# Patient Record
Sex: MALE
[De-identification: names, ages, dates, MRNs, and addresses within clinical notes are randomized per-mention and may not be internally consistent; named-entity substitution may affect disease eponyms.]

---

## 2023-07-07 ENCOUNTER — RX ONLY (RX ONLY)
Age: 73
End: 2023-07-07

## 2023-07-07 ENCOUNTER — OFFICE (OUTPATIENT)
Facility: LOCATION | Age: 73
Setting detail: OPHTHALMOLOGY
End: 2023-07-07
Payer: MEDICARE

## 2023-07-07 DIAGNOSIS — B02.31: ICD-10-CM

## 2023-07-07 DIAGNOSIS — H25.13: ICD-10-CM

## 2023-07-07 PROBLEM — H43.393 VITREOUS FLOATERS; BOTH EYES: Status: ACTIVE | Noted: 2023-07-07

## 2023-07-07 PROBLEM — H16.223 DRY EYE SYNDROME K SICCA; BOTH EYES: Status: ACTIVE | Noted: 2023-07-07

## 2023-07-07 PROBLEM — H35.40 PERIPAPILLARY ATROPHY ; BOTH EYES: Status: ACTIVE | Noted: 2023-07-07

## 2023-07-07 PROBLEM — H01.00 BLEPHARITIS ; BOTH EYES: Status: ACTIVE | Noted: 2023-07-07

## 2023-07-07 PROCEDURE — 99213 OFFICE O/P EST LOW 20 MIN: CPT | Performed by: OPHTHALMOLOGY

## 2023-07-07 ASSESSMENT — LID EXAM ASSESSMENTS
OD_COMMENTS: GPC
OS_BLEPHARITIS: T
OD_BLEPHARITIS: T
OS_COMMENTS: GPC

## 2023-07-07 ASSESSMENT — TONOMETRY
OD_IOP_MMHG: 16
OS_IOP_MMHG: 16

## 2023-07-07 ASSESSMENT — SUPERFICIAL PUNCTATE KERATITIS (SPK)
OS_SPK: T
OD_SPK: T

## 2023-07-07 ASSESSMENT — CONFRONTATIONAL VISUAL FIELD TEST (CVF)
OS_FINDINGS: FULL
OD_FINDINGS: FULL

## 2023-07-12 ASSESSMENT — VISUAL ACUITY
OS_BCVA: 20/25
OD_BCVA: 20/25+2

## 2024-06-19 PROBLEM — Z00.00 ENCOUNTER FOR PREVENTIVE HEALTH EXAMINATION: Status: ACTIVE | Noted: 2024-06-19

## 2024-06-21 ENCOUNTER — APPOINTMENT (OUTPATIENT)
Dept: ORTHOPEDIC SURGERY | Facility: CLINIC | Age: 74
End: 2024-06-21
Payer: MEDICARE

## 2024-06-21 VITALS — BODY MASS INDEX: 24.44 KG/M2 | HEIGHT: 69 IN | WEIGHT: 165 LBS

## 2024-06-21 DIAGNOSIS — M23.91 UNSPECIFIED INTERNAL DERANGEMENT OF RIGHT KNEE: ICD-10-CM

## 2024-06-21 DIAGNOSIS — Z78.9 OTHER SPECIFIED HEALTH STATUS: ICD-10-CM

## 2024-06-21 DIAGNOSIS — I10 ESSENTIAL (PRIMARY) HYPERTENSION: ICD-10-CM

## 2024-06-21 PROCEDURE — 99203 OFFICE O/P NEW LOW 30 MIN: CPT

## 2024-06-21 PROCEDURE — 73562 X-RAY EXAM OF KNEE 3: CPT | Mod: RT

## 2024-06-21 RX ORDER — AMLODIPINE AND ATORVASTATIN 5; 20 MG/1; MG/1
5-20 TABLET, COATED ORAL
Refills: 0 | Status: ACTIVE | COMMUNITY

## 2024-06-21 RX ORDER — OMEPRAZOLE 20 MG/1
20 TABLET, DELAYED RELEASE ORAL
Refills: 0 | Status: ACTIVE | COMMUNITY

## 2024-06-21 NOTE — HISTORY OF PRESENT ILLNESS
[de-identified] : Patient presents today for right knee pain. Patient states he was picking up twigs and branches when he stepped wrong 10 days ago.  Patient states he has pain along the medial aspect of the knee. Patient states he feels increased pain and tightness with full extension.  Patient states he has been using ice. Patient admits to taking Ibuprofen for pain with some relief.  [7] : 7 [2] : 2 [Dull/Aching] : dull/aching [Intermittent] : intermittent [Household chores] : household chores [Leisure] : leisure [Social interactions] : social interactions [Rest] : rest [Retired] : Work status: retired [] : no [FreeTextEntry1] : right knee [de-identified] : movement

## 2024-06-21 NOTE — PHYSICAL EXAM
[de-identified] : Examination of the right knee is as follows: INSPECTION: mild effusion, but no ecchymosis, no erythema, no atrophy, no deformities of quad tendon or of patellar tendon PALPATION: medial joint line tenderness, but no palpable mass, no obvious defects, no increased warmth, no calf tenderness. TTP over fibular head ROM: flexion 125 degrees, but extension 0 degrees STRENGTH: quadriceps 5/5, hamstring 5/5 TESTING: + McMurrays NEURO: light touch is intact throughout GAIT: antalgic, but patient ambulates without assistive device  X-rays of the right knee is as follows:  Knee 3 view in the anteroposterior, lateral, skyline views: moderate tricompartmental OA with medial joint space narrowing

## 2024-06-21 NOTE — ASSESSMENT
[FreeTextEntry1] : 72 yo patient presents today with suspected RT Knee medial meniscus tear. Patient reports some increased discomfort with any pivoting movements, and some weakness in the knee.   -Activities as tolerated -Rest, ice, compression, elevation, NSAIDs PRN for pain.  -All questions answered -F/u 6 weeks  The diagnosis was explained in detail. The potential non-surgical and surgical treatments were reviewed. The relative risks and benefits of each option were considered relative to the patients age, activity level, medical history, symptom severity and previously attempted treatments.  The patient was advised to consult with their primary medical provider prior to initiation of any new medications to reduce the risk of adverse effects specific to their long-term home medications and medical history. The risk of gastrointestinal irritation and kidney injury specific to long-term NSAID use was discussed.   Entered by Clay Shannon acting as scribe. Dr. Ortiz Attestation The documentation recorded by the scribe, in my presence, accurately reflects the service I, Dr. Ortiz, personally performed, and the decisions made by me with my edits as appropriate.

## 2024-08-02 ENCOUNTER — APPOINTMENT (OUTPATIENT)
Dept: ORTHOPEDIC SURGERY | Facility: CLINIC | Age: 74
End: 2024-08-02
Payer: MEDICARE

## 2024-08-02 DIAGNOSIS — M23.91 UNSPECIFIED INTERNAL DERANGEMENT OF RIGHT KNEE: ICD-10-CM

## 2024-08-02 PROCEDURE — 99213 OFFICE O/P EST LOW 20 MIN: CPT

## 2024-08-02 NOTE — ASSESSMENT
[FreeTextEntry1] : 72 yo patient presenting today for f/u of suspected RT Knee medial meniscus tear. Patient stating that his pain overall is improved, notes pain with twisting motion of the knee.  -Discussed with patient that in the future if the pain worsens would recommend MRI to plan for arthroscopic surgery, patient expressed understanding -Activities as tolerated -Rest, ice, compression, elevation, NSAIDs PRN for pain.  -All questions answered -F/u PRN  The diagnosis was explained in detail. The potential non-surgical and surgical treatments were reviewed. The relative risks and benefits of each option were considered relative to the patients age, activity level, medical history, symptom severity and previously attempted treatments.  The patient was advised to consult with their primary medical provider prior to initiation of any new medications to reduce the risk of adverse effects specific to their long-term home medications and medical history. The risk of gastrointestinal irritation and kidney injury specific to long-term NSAID use was discussed.   Entered by John Tavarez PA-C acting as scribe. Dr. rOtiz Attestation The documentation recorded by the scribe, in my presence, accurately reflects the service I, Dr. Ortiz, personally performed, and the decisions made by me with my edits as appropriate.

## 2024-08-02 NOTE — PHYSICAL EXAM
[de-identified] : Examination of the right knee is as follows: INSPECTION: no effusion, no ecchymosis, no erythema, no atrophy, no deformities of quad tendon or of patellar tendon PALPATION: no medial joint line tenderness, no palpable mass, no obvious defects, no increased warmth, no calf tenderness. TTP over fibular head ROM: flexion 125 degrees, but extension 0 degrees STRENGTH: quadriceps 5/5, hamstring 5/5 TESTING: - McMurrays NEURO: light touch is intact throughout GAIT: non-antalgic, but patient ambulates without assistive device  X-rays of the right knee is as follows:  Knee 3 view in the anteroposterior, lateral, skyline views: mild tricompartmental OA with medial joint space narrowing

## 2024-08-02 NOTE — HISTORY OF PRESENT ILLNESS
[Sudden] : sudden [2] : 2 [0] : 0 [Dull/Aching] : dull/aching [Intermittent] : intermittent [Rest] : rest [Retired] : Work status: retired [de-identified] : Patient presents today for right knee pain. Patient being treated for medial meniscus tear. Patient states he has minimal aching pain with ROM.  [] : Post Surgical Visit: no [FreeTextEntry1] : Right knee [FreeTextEntry3] : 6/2024 [FreeTextEntry5] : Patient states he was picking up twigs and branches when he stepped wrong  [de-identified] : ROM

## 2025-06-05 ENCOUNTER — APPOINTMENT (OUTPATIENT)
Dept: ORTHOPEDIC SURGERY | Facility: CLINIC | Age: 75
End: 2025-06-05
Payer: MEDICARE

## 2025-06-05 VITALS — HEIGHT: 69 IN | WEIGHT: 165 LBS | BODY MASS INDEX: 24.44 KG/M2

## 2025-06-05 DIAGNOSIS — M51.369: ICD-10-CM

## 2025-06-05 DIAGNOSIS — M54.16 RADICULOPATHY, LUMBAR REGION: ICD-10-CM

## 2025-06-05 DIAGNOSIS — M48.061 SPINAL STENOSIS, LUMBAR REGION WITHOUT NEUROGENIC CLAUDICATION: ICD-10-CM

## 2025-06-05 DIAGNOSIS — M54.12 RADICULOPATHY, CERVICAL REGION: ICD-10-CM

## 2025-06-05 DIAGNOSIS — M48.02 SPINAL STENOSIS, CERVICAL REGION: ICD-10-CM

## 2025-06-05 DIAGNOSIS — M51.34 OTHER INTERVERTEBRAL DISC DEGENERATION, THORACIC REGION: ICD-10-CM

## 2025-06-05 PROCEDURE — 72100 X-RAY EXAM L-S SPINE 2/3 VWS: CPT

## 2025-06-05 PROCEDURE — 72040 X-RAY EXAM NECK SPINE 2-3 VW: CPT

## 2025-06-05 PROCEDURE — 72070 X-RAY EXAM THORAC SPINE 2VWS: CPT

## 2025-06-05 PROCEDURE — 99214 OFFICE O/P EST MOD 30 MIN: CPT

## 2025-06-17 ENCOUNTER — APPOINTMENT (OUTPATIENT)
Dept: NEUROLOGY | Facility: CLINIC | Age: 75
End: 2025-06-17
Payer: MEDICARE

## 2025-06-17 PROCEDURE — 95912 NRV CNDJ TEST 11-12 STUDIES: CPT

## 2025-06-17 PROCEDURE — 95886 MUSC TEST DONE W/N TEST COMP: CPT

## 2025-07-31 ENCOUNTER — APPOINTMENT (OUTPATIENT)
Dept: ORTHOPEDIC SURGERY | Facility: CLINIC | Age: 75
End: 2025-07-31
Payer: MEDICARE

## 2025-07-31 DIAGNOSIS — M48.02 SPINAL STENOSIS, CERVICAL REGION: ICD-10-CM

## 2025-07-31 DIAGNOSIS — M51.369: ICD-10-CM

## 2025-07-31 DIAGNOSIS — M54.16 RADICULOPATHY, LUMBAR REGION: ICD-10-CM

## 2025-07-31 DIAGNOSIS — M54.12 RADICULOPATHY, CERVICAL REGION: ICD-10-CM

## 2025-07-31 DIAGNOSIS — M48.061 SPINAL STENOSIS, LUMBAR REGION WITHOUT NEUROGENIC CLAUDICATION: ICD-10-CM

## 2025-07-31 PROCEDURE — 99214 OFFICE O/P EST MOD 30 MIN: CPT
